# Patient Record
Sex: FEMALE | Race: WHITE | URBAN - METROPOLITAN AREA
[De-identification: names, ages, dates, MRNs, and addresses within clinical notes are randomized per-mention and may not be internally consistent; named-entity substitution may affect disease eponyms.]

---
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
## 2019-08-26 ENCOUNTER — OFFICE VISIT (OUTPATIENT)
Dept: NEUROLOGY | Age: 69
End: 2019-08-26

## 2019-08-26 DIAGNOSIS — R41.3 MEMORY LOSS: Primary | ICD-10-CM

## 2019-08-26 DIAGNOSIS — F41.9 ANXIETY DISORDER, UNSPECIFIED TYPE: Chronic | ICD-10-CM

## 2019-08-26 DIAGNOSIS — R47.89 WORD FINDING DIFFICULTY: ICD-10-CM

## 2019-09-12 ENCOUNTER — OFFICE VISIT (OUTPATIENT)
Dept: NEUROLOGY | Age: 69
End: 2019-09-12

## 2019-09-12 DIAGNOSIS — R41.3 MEMORY LOSS: Primary | ICD-10-CM

## 2019-09-16 ENCOUNTER — OFFICE VISIT (OUTPATIENT)
Dept: NEUROLOGY | Age: 69
End: 2019-09-16

## 2019-09-16 DIAGNOSIS — F41.8 ANXIETY ABOUT HEALTH: ICD-10-CM

## 2019-09-16 DIAGNOSIS — G31.84 MILD COGNITIVE IMPAIRMENT: Primary | ICD-10-CM

## 2019-09-16 NOTE — PROGRESS NOTES
Patient arrived for testing but due to slow time to completion and lengthy battery of tests, an additional day of testing is required. Patient will return to complete; report to follow once testing, scoring, and interpretation completed.
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
## 2019-10-04 ENCOUNTER — OFFICE VISIT (OUTPATIENT)
Dept: NEUROLOGY | Age: 69
End: 2019-10-04

## 2019-10-04 DIAGNOSIS — G31.84 MILD NEUROCOGNITIVE DISORDER: Primary | ICD-10-CM

## 2019-10-04 DIAGNOSIS — F41.8 ANXIETY ABOUT HEALTH: ICD-10-CM

## 2019-10-04 NOTE — PROGRESS NOTES
Interactive Psychotherapy/office feedback        Interactive office feedback session with Ms. Tawana Garcia and her . I reviewed the results of the recent Neuropsychological Evaluation  including the observed areas of neurocognitive strengths and weaknesses. Education was provided regarding my diagnostic impressions, and treatment plan/options were discussed. I also answered numerous questions related to the clinical findings, including the various methods to improve cognition and mood. CBT, psychoeducation, and supportive psychotherapy techniques were utilized. Prior to seeing the patient I reviewed the records, including the previously completed report, the records in [place], and any updated visits from other providers since I saw the patient last.       Diagnoses:       1. MCI   2. Anxiety about health     The patient will follow up with the referring provider, and reported being very pleased with the services provided. Follow up with [facility] prn.       75405 psychotherapy 30 Minutes      This note will not be viewable in [address] This note was created using voice recognition software. Despite editing, there may be syntax errors.

## 2022-12-05 ENCOUNTER — OFFICE VISIT (OUTPATIENT)
Dept: NEUROLOGY | Age: 72
End: 2022-12-05
Payer: MEDICARE

## 2022-12-05 VITALS
RESPIRATION RATE: 18 BRPM | DIASTOLIC BLOOD PRESSURE: 90 MMHG | HEIGHT: 62 IN | WEIGHT: 124 LBS | SYSTOLIC BLOOD PRESSURE: 160 MMHG | OXYGEN SATURATION: 90 % | HEART RATE: 90 BPM | BODY MASS INDEX: 22.82 KG/M2

## 2022-12-05 DIAGNOSIS — G31.84 MCI (MILD COGNITIVE IMPAIRMENT): ICD-10-CM

## 2022-12-05 DIAGNOSIS — R41.3 MEMORY LOSS: Primary | ICD-10-CM

## 2022-12-05 DIAGNOSIS — R41.3 MEMORY LOSS: ICD-10-CM

## 2022-12-05 PROCEDURE — 1090F PRES/ABSN URINE INCON ASSESS: CPT | Performed by: NURSE PRACTITIONER

## 2022-12-05 PROCEDURE — G8420 CALC BMI NORM PARAMETERS: HCPCS | Performed by: NURSE PRACTITIONER

## 2022-12-05 PROCEDURE — 1123F ACP DISCUSS/DSCN MKR DOCD: CPT | Performed by: NURSE PRACTITIONER

## 2022-12-05 PROCEDURE — 99204 OFFICE O/P NEW MOD 45 MIN: CPT | Performed by: NURSE PRACTITIONER

## 2022-12-05 PROCEDURE — 3017F COLORECTAL CA SCREEN DOC REV: CPT | Performed by: NURSE PRACTITIONER

## 2022-12-05 PROCEDURE — G8427 DOCREV CUR MEDS BY ELIG CLIN: HCPCS | Performed by: NURSE PRACTITIONER

## 2022-12-05 PROCEDURE — G8432 DEP SCR NOT DOC, RNG: HCPCS | Performed by: NURSE PRACTITIONER

## 2022-12-05 PROCEDURE — 1101F PT FALLS ASSESS-DOCD LE1/YR: CPT | Performed by: NURSE PRACTITIONER

## 2022-12-05 PROCEDURE — G8400 PT W/DXA NO RESULTS DOC: HCPCS | Performed by: NURSE PRACTITIONER

## 2022-12-05 PROCEDURE — G8536 NO DOC ELDER MAL SCRN: HCPCS | Performed by: NURSE PRACTITIONER

## 2022-12-05 RX ORDER — MIRTAZAPINE 15 MG/1
15 TABLET, FILM COATED ORAL
COMMUNITY

## 2022-12-05 RX ORDER — ALBUTEROL SULFATE 90 UG/1
AEROSOL, METERED RESPIRATORY (INHALATION)
COMMUNITY
Start: 2022-11-21

## 2022-12-05 NOTE — PATIENT INSTRUCTIONS
Patient instructions:  -MRI brain and EEG (scheduling will call you)  -please have labs obtained anytime  -neuropsychological re-evaluation  -speech therapy/ cognitive therapy  -follow up in 3 months

## 2022-12-05 NOTE — PROGRESS NOTES
[address] [id number]. [facility], [place], [address]  Office:  [phone]  Fax: [phone]    Referring: HARDY Claire    Chief Complaint   Patient presents with    Memory Loss    New Patient       HPI: Sandra Mcconnell presents for new patient evaluation for memory loss. She has history of mild cognitive impairment. It was noted that she has had declining memory for the past 2 years and family was requesting referral to neurology. She scored 14 out of 30 on the MMSE with PCP. She has medical history to include COPD/asthma, hyperlipidemia, hypertension, and anxiety. She has history of COVID-19 infection. She presents today in evaluation. She is with her . He reports she was given a test by primary care, she failed it so she was referred here. They had some concerns about her cognitive abilities. If her  thinks she is not doing something right she shrugs it off. He reports she had covid brain fog after the neuropsych evaluation. They vaguely remember the neuropsych eval in 2019. He reports she loses track of where she is and what she's doing a lot of times. 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 The covid brain fog was really scary. Forgot who her son was. Had covid 3 or 4 times. One time she was admitted he thinks. It was covid related pneumonia. May have seen Dr. Estela Grider. No head imaging available. She did billing and coding for work; stopped around 3-4 years ago. They had changed the whole system. She said forget this and quit. Denies history of stroke, seizures, head injury, or LOC spells. Sleep is good. Denies signs of sleep apnea. Mental health wise, feels like w friends, just at home with  and the dogs. Was more active before covid. Would visit people more often. Not on meds besides prn albuterol, since covid. Uses it about daily. Also on Remeron for appetite.   Onset of memory concerns- some before covid. He had concern- she was smartest person he knew, so he noticed subtle decline. Independent in ADLs. He helps now with appointments and finances. This was some decline they noticed. She says she is bothered by cold temperatures in the house. Wants to be able to breathe better. Having a hard time eating. TSH normal 4/26/22. Weight loss and poor appetite. She gets irritable once in a while, no violence or unsafe behaviors. No hallucinations. She says she gets lost in what she should be doing. Does not drive. Stopped 2 years ago with the covid brain fog. Denies balance problems or falls. Denies incontinence. Wears glasses. No hearing problems. She reads a lot. Denies smoking, alcohol, or drug use. Family history: sister with mental disorders and had always had psychological disorders, now in long term care. Had 3 brothers- one with bipolar disorder they believe, one with PD. Parents- mom passed away in her late 62s, no memory problems; father was in his [de-identified], had dementia in last year. Social History     Socioeconomic History    Marital status: UNKNOWN     Spouse name: Not on file    Number of children: Not on file    Years of education: Not on file    Highest education level: Not on file   Occupational History    Not on file   Tobacco Use    Smoking status: Not on file    Smokeless tobacco: Not on file   Substance and Sexual Activity    Alcohol use: Not on file    Drug use: Not on file    Sexual activity: Not on file   Other Topics Concern    Not on file   Social History Narrative    Not on file     Social Determinants of Health     Financial Resource Strain: Not on file   Food Insecurity: Not on file   Transportation Needs: Not on file   Physical Activity: Not on file   Stress: Not on file   Social Connections: Not on file   Intimate Partner Violence: Not on file   Housing Stability: Not on file       No family history on file. No past medical history on file.     No past surgical history on file. Allergies   Allergen Reactions    Aspirin Shortness of Breath     Respiratory Distress       There is no problem list on file for this patient. Review of Systems:   Constitutional: no fever or chills  Skin denies rash or itching  HEENT:  Denies tinnitus, hearing loss, or visual changes. +wears glasses. Respiratory: denies shortness of breath. +occasional SOB. Cardiovascular: denies chest pain. Some dyspnea as above. Gastrointestinal: does not report nausea or vomiting  Genitourinary: does not report dysuria or incontinence  Musculoskeletal: does not report joint pain or swelling  Endocrine: +weight loss. Hematology: denies easy bruising or bleeding   Neurological: as above in HPI      PHYSICAL EXAMINATION:      VITAL SIGNS:  Visit Vitals  BP (!) 160/90   Pulse 90   Resp 18   Ht 5' 2\" (1.575 m)   Wt 56.2 kg (124 lb)   SpO2 90%   BMI 22.68 kg/m²       GENERAL: Well developed, well nourished, in no apparent distress. HEART: RR, no murmurs heard, no carotid bruits. LUNGS:                      Respirations regular and unlabored. EXTREMITIES: No clubbing, cyanosis, or edema is identified. Pulses 2+    and symmetrical.  HEAD:   Normocephalic, atraumatic. NEUROLOGIC EXAMINATION    MENTAL STATUS: Awake, alert, and oriented x 4. Attention and STM are impaired. Some issues with expressive speech; says wrong word a few times. Mood and affect are appropriate. She scored 19 out of 30 on MMSE. Oriented to year but not season, date, day, or month. Not able to state what type of place we are in. Registration 3 out of 3 objects. Recall at 1 minute 1 out of 3 objects. Incorrect on phrase repetition. Sentence writing and design copying intact. Clock drawing impaired. She faith the numbers outside the clock. Did not indicate the specific time indicated. Speech clear.  assisted with some of the history. CRANIAL NERVES: Visual fields are full to confrontation.   Pupils are reactive to light and accommodation. Extraocular movements are intact and there is no nystagmus. Facial sensation is normal.  Face is symmetrical.   Hearing is grossly intact. SCM/TPZ 5/5. Palate rises symmetrically. Tongue is in the midline. MOTOR:  Normal tone, bulk, and strength, 5/5 muscle strength throughout. No cogwheel rigidity. No drift of the bilateral upper extremities. Fine finger movements symmetrical.    CEREBELLAR: Finger to nose was normal.   Mild bilateral upper extremity action tremor. SENSORY: Normal PP, vibration. Romberg negative. DTRs: +2 throughout. GAIT:   Gait is steady. Can heel walk, toe walk, and tandem walk. Impression/Plan: This is a 40-year-old right-handed female who presents for new patient evaluation for memory loss. Memory loss noted over the past few years. Also noted to have had COVID brain fog when she had COVID in 2020 or 2021. The onset of memory concerns was prior to that. She reports that she just gets lost in what she is doing, does not know if she should be doing 1 thing or another. Independent in ADLs. She has help with her  at home for IADLs. She no longer drives after the COVID brain fog. She has undergone neuropsychological evaluation completed in September 2019 with Dr. Shawn Cabello here. Impression at that time was mild cognitive impairment and anxiety about health. The impression was that the cognitive weaknesses were suspected to be based more anxiety than neurodegenerative or other disease pathology. For now we will obtain work-up with imaging study with MRI of the brain. We will obtain EEG. Labs for vitamin B-12 level and folate, MMA. She reports that she has issues with focus. Will refer for speech therapy for cognitive therapy due to expressive speech and communication issues noted. Encouraged to keep active with activities that stimulate the mind. She is active with reading. Encouraged exercise. We will obtain a neuropsychological reevaluation. We will provide a copy of the report from 2019 for comparison. We will plan to follow up in about 3 months. Diagnoses and all orders for this visit:    1. Memory loss  -     MRI BRAIN WO CONT; Future  -     EEG AWAKE AND ASLEEP; Future  -     VITAMIN B12 & FOLATE; Future  -     METHYLMALONIC ACID; Future  -     REFERRAL TO NEUROPSYCHOLOGY  -     REFERRAL TO SPEECH THERAPY    2. MCI (mild cognitive impairment)  -     REFERRAL TO NEUROPSYCHOLOGY  -     REFERRAL TO SPEECH THERAPY      I spent 55 minutes with the patient in face-to-face consultation, with 30 minutes spent in counseling and coordination of care as described above. Signed By: Lennox Speaks, NP              PLEASE NOTE:   Portions of this document may have been produced using voice recognition software. Unrecognized errors in transcription may be present.

## 2022-12-06 DIAGNOSIS — R41.3 MEMORY LOSS: ICD-10-CM
